# Patient Record
Sex: FEMALE | Race: WHITE | Employment: UNEMPLOYED | URBAN - METROPOLITAN AREA
[De-identification: names, ages, dates, MRNs, and addresses within clinical notes are randomized per-mention and may not be internally consistent; named-entity substitution may affect disease eponyms.]

---

## 2022-01-04 ENCOUNTER — OFFICE VISIT (OUTPATIENT)
Dept: BARIATRICS/WEIGHT MGMT | Age: 57
End: 2022-01-04
Payer: COMMERCIAL

## 2022-01-04 VITALS
BODY MASS INDEX: 34.02 KG/M2 | SYSTOLIC BLOOD PRESSURE: 130 MMHG | HEIGHT: 64 IN | DIASTOLIC BLOOD PRESSURE: 90 MMHG | OXYGEN SATURATION: 98 % | HEART RATE: 94 BPM | WEIGHT: 199.3 LBS

## 2022-01-04 DIAGNOSIS — K64.5 EXTERNAL HEMORRHOID, THROMBOSED: Primary | ICD-10-CM

## 2022-01-04 PROCEDURE — G8427 DOCREV CUR MEDS BY ELIG CLIN: HCPCS | Performed by: SURGERY

## 2022-01-04 PROCEDURE — G8484 FLU IMMUNIZE NO ADMIN: HCPCS | Performed by: SURGERY

## 2022-01-04 PROCEDURE — 99203 OFFICE O/P NEW LOW 30 MIN: CPT | Performed by: SURGERY

## 2022-01-04 PROCEDURE — 1036F TOBACCO NON-USER: CPT | Performed by: SURGERY

## 2022-01-04 PROCEDURE — G8417 CALC BMI ABV UP PARAM F/U: HCPCS | Performed by: SURGERY

## 2022-01-04 PROCEDURE — 3017F COLORECTAL CA SCREEN DOC REV: CPT | Performed by: SURGERY

## 2022-01-04 RX ORDER — LEVOTHYROXINE SODIUM 0.1 MG/1
100 TABLET ORAL DAILY
COMMUNITY

## 2022-01-04 RX ORDER — SODIUM CHLORIDE 0.9 % (FLUSH) 0.9 %
5-40 SYRINGE (ML) INJECTION PRN
Status: CANCELLED | OUTPATIENT
Start: 2022-01-04

## 2022-01-04 RX ORDER — TAMOXIFEN CITRATE 10 MG/1
20 TABLET ORAL DAILY
COMMUNITY

## 2022-01-04 RX ORDER — SODIUM CHLORIDE 9 MG/ML
25 INJECTION, SOLUTION INTRAVENOUS PRN
Status: CANCELLED | OUTPATIENT
Start: 2022-01-04

## 2022-01-04 RX ORDER — SODIUM CHLORIDE 0.9 % (FLUSH) 0.9 %
5-40 SYRINGE (ML) INJECTION EVERY 12 HOURS SCHEDULED
Status: CANCELLED | OUTPATIENT
Start: 2022-01-04

## 2022-01-04 RX ORDER — SODIUM CHLORIDE, SODIUM LACTATE, POTASSIUM CHLORIDE, CALCIUM CHLORIDE 600; 310; 30; 20 MG/100ML; MG/100ML; MG/100ML; MG/100ML
INJECTION, SOLUTION INTRAVENOUS CONTINUOUS
Status: CANCELLED | OUTPATIENT
Start: 2022-01-04

## 2022-01-04 ASSESSMENT — PATIENT HEALTH QUESTIONNAIRE - PHQ9
SUM OF ALL RESPONSES TO PHQ QUESTIONS 1-9: 0
SUM OF ALL RESPONSES TO PHQ QUESTIONS 1-9: 0
SUM OF ALL RESPONSES TO PHQ9 QUESTIONS 1 & 2: 0
SUM OF ALL RESPONSES TO PHQ QUESTIONS 1-9: 0
SUM OF ALL RESPONSES TO PHQ QUESTIONS 1-9: 0
1. LITTLE INTEREST OR PLEASURE IN DOING THINGS: 0
2. FEELING DOWN, DEPRESSED OR HOPELESS: 0

## 2022-01-04 ASSESSMENT — ENCOUNTER SYMPTOMS: ANAL BLEEDING: 1

## 2022-01-04 NOTE — PROGRESS NOTES
file   Physical Activity:     Days of Exercise per Week: Not on file    Minutes of Exercise per Session: Not on file   Stress:     Feeling of Stress : Not on file   Social Connections:     Frequency of Communication with Friends and Family: Not on file    Frequency of Social Gatherings with Friends and Family: Not on file    Attends Christianity Services: Not on file    Active Member of [address] [place] [address] or Organizations: Not on file    Attends Club or Organization Meetings: Not on file    Marital Status: Not on file   Intimate Partner Violence:     Fear of Current or Ex-Partner: Not on file    Emotionally Abused: Not on file    Physically Abused: Not on file    Sexually Abused: Not on file   Housing Stability:     Unable to Pay for Housing in the Last Year: Not on file    Number of [place] in the Last Year: Not on file    Unstable Housing in the Last Year: Not on file       Current Outpatient Medications   Medication Sig Dispense Refill    tamoxifen (NOLVADEX) 10 MG tablet Take 20 mg by mouth daily TAKE ONE TABLET BY MOUTH DAILY.  levothyroxine (SYNTHROID) 100 MCG tablet Take 100 mcg by mouth Daily TAKE ONE TABLET BY MOUTH DAILY. No current facility-administered medications for this visit. No Known Allergies    Review of Systems:         Review of Systems   Gastrointestinal: Positive for anal bleeding. All other systems reviewed and are negative. OBJECTIVE:  Physical Exam:    BP (!) 130/90 (Site: Left Upper Arm, Position: Sitting, Cuff Size: Large Adult)   Pulse 94   Ht 5' 4\" (1.626 m)   Wt 199 lb 4.8 oz (90.4 kg)   SpO2 98%   BMI 34.21 kg/m²      Physical Exam  Vitals reviewed. Constitutional:       General: She is not in acute distress. Appearance: She is not ill-appearing, toxic-appearing or diaphoretic. HENT:      Head: Normocephalic and atraumatic.       Right Ear: External ear normal.      Left Ear: External ear normal.      Nose: Nose normal.   Eyes:      General: Right eye: No discharge. Left eye: No discharge. Extraocular Movements: Extraocular movements intact. Cardiovascular:      Rate and Rhythm: Normal rate. Pulmonary:      Effort: No respiratory distress. Abdominal:      Palpations: Abdomen is soft. Genitourinary:     Comments: External thrombosed hemorrhoid at ~ 3-4 o clock position. No active bleeding. Musculoskeletal:         General: Normal range of motion. Cervical back: Normal range of motion. Skin:     General: Skin is warm. Neurological:      General: No focal deficit present. Mental Status: She is alert. Psychiatric:         Mood and Affect: Mood normal.           ASSESSMENT:  1. External hemorrhoid, thrombosed          PLAN:  Treatment:      -No need for opening and removal of clot as pain has now subsided and initially started 6 days ago. Did d/w pt and  natural course of thrombosed hemorrhoid.     -Pt's main complaint is bleeding. No active bleeding. Did d/w her and her  that would not perform external hemorrhoidectomy in the office bc concern about anesthetic / pain control.     -Mutually agreed after discussing pros and cons that we will plan on doing hemorrhoidectomy after pt is back from her upcoming trips. Did d/w her if she starts to bleed on trip that she should go to ED.     -Continue proper hydration, increase daily fiber (min 25 g / day), and stool softener until able to perform hemorrhoidectomy.     -Orders placed. Consent obtained. Reviewed in detail with the patient and/or family the expected pre-operative, operative, and post-operative courses including risks, benefits, and alternatives to the procedure. The patient's questions were answered in detail and agreed to proceed with the procedure.     -The patient was counseled at length about the risks of glenda Covid-19 during their perioperative period and any recovery window from their procedure.   The patient was made aware that glenda Covid-19  may worsen their prognosis for recovering from their procedure  and lend to a higher morbidity and/or mortality risk. All material risks, benefits, and reasonable alternatives including postponing the procedure were discussed. The patient does wish to proceed with the procedure at this time. .  Patient counseled on risks, benefits, and alternatives of treatment plan at length today. Patient states an understanding and willingness to proceed with plan. No orders of the defined types were placed in this encounter. No orders of the defined types were placed in this encounter. Follow Up:  No follow-ups on file.       Eliana Singh MD

## 2022-01-10 ENCOUNTER — TELEPHONE (OUTPATIENT)
Dept: BARIATRICS/WEIGHT MGMT | Age: 57
End: 2022-01-10

## 2022-01-10 NOTE — TELEPHONE ENCOUNTER
LEFT MESSAGE FOR Amelia Corey REGARDING SURGERY (EXTERNAL HEMORRHOIDECTOMY) SCHEDULED @ [facility].  NOTIFIED OF DATES, TIMES AND LOCATION    PHONE ASSESSMENT   COVID - 2/18/22 @ 262  SURGERY - 2/23/22 @ 890  P/O - 3/8/22 @ 900    NPO AFTER MIDNIGHT  HOLD BLOOD THINNERS

## 2022-01-25 ENCOUNTER — TELEPHONE (OUTPATIENT)
Dept: BARIATRICS/WEIGHT MGMT | Age: 57
End: 2022-01-25

## 2022-01-25 NOTE — TELEPHONE ENCOUNTER
Tue 2022 03:31:20 PM EST  [facility]  Sofie Madrid [address], [address]  JM:[id number]  :Sex:MAGGIE Baker  KA:[phone]  :Nov Medhat Jeb  Referring Provider:  Tacy Nageotte  [address] [facility], [place], [address]  HOP:[phone]  [address]:  [address] [facility]  [address] [facility], [facility], [address] [facility]  PZ:[phone]  QVS:[phone]  TRS:[phone]  Servicing Provider:  Tacy Nageotte  [address] [employer] [address], [facility], [address] [facility]  BP:[phone]  RMO:[id number]  VTM:[phone]  Category:  Health Services Review    Service:  Surgical    Facility:  [facility]    Certification:  Initial    Requested Dates:  2022 - May 24, 2022    Diagnosis codes:  1. K64.4    Residual hemorrhoidal skin tags    Requested Services:  1.  68119:    REMOVAL OF SINGLE EXTERNAL AND INTERNAL HEMORRHOID GROUP  Status: [facility]; [id number] [facility], [address]    1    2.  [id number]:    STAPLING OF INTERNAL HEMORRHOID  Status: [facility]; [id number] [facility], [address]    1    Status:  Joni Harmon 5334 #:  [id number]    Message:  Payment is based on member eligibility, medical necessity review, where applicable and [facility] provider contractual agreement. Authorization does not guarantee payment.

## 2022-02-11 DIAGNOSIS — Z01.818 PRE-OP TESTING: Primary | ICD-10-CM

## 2022-02-18 ENCOUNTER — HOSPITAL ENCOUNTER (OUTPATIENT)
Age: 57
Setting detail: SPECIMEN
Discharge: HOME | End: 2022-02-18
Payer: COMMERCIAL

## 2022-02-18 ENCOUNTER — NURSE ONLY (OUTPATIENT)
Dept: SURGERY | Age: 57
End: 2022-02-18
Payer: COMMERCIAL

## 2022-02-18 DIAGNOSIS — Z01.818 PRE-OP TESTING: Primary | ICD-10-CM

## 2022-02-18 LAB
SARS-COV-2: NOT DETECTED
SOURCE: NORMAL

## 2022-02-18 PROCEDURE — U0003 INFECTIOUS AGENT DETECTION BY NUCLEIC ACID (DNA OR RNA); SEVERE ACUTE RESPIRATORY SYNDROME CORONAVIRUS 2 (SARS-COV-2) (CORONAVIRUS DISEASE [COVID-19]), AMPLIFIED PROBE TECHNIQUE, MAKING USE OF HIGH THROUGHPUT TECHNOLOGIES AS DESCRIBED BY CMS-2020-01-R: HCPCS

## 2022-02-18 PROCEDURE — 99211 OFF/OP EST MAY X REQ PHY/QHP: CPT | Performed by: SURGERY

## 2022-02-18 PROCEDURE — U0005 INFEC AGEN DETEC AMPLI PROBE: HCPCS

## 2022-02-18 NOTE — PATIENT INSTRUCTIONS
Pre-Procedure COVID-19 Self Testing  Quarantine Instructions  Day of Surgery Instructions         What to do before my surgery:    All patients scheduled for elective surgery must test for COVID19 72-96 hours prior to the surgery date.  Pre-Procedure COVID-19 Self-Test will be scheduled for you by your provider.  You can receive your Pre-Procedure COVID-19 Self-Test at:  [facility]. [address] [facility], [address] [facility],Third Floor   If you do not have the COVID-19 test we will cancel or reschedule your procedure   Once you test you must quarantine at home until after your procedure with only your immediate family members or whoever lives with you.  If you must work during your quarantine period, we ask that you continue to practice social distancing, wear a mask that covers your mouth and nose and perform all hand hygiene as recommended by the CDC.  If you must go to the grocery, etc. and cannot get someone to do this for you please wear a mask that covers your mouth and nose and perform all hand hygiene as recommended by the CDC.  Your surgeon's office will notify you with any concerns about your test result. What can I expect on the day of surgery?  Arrive at the time the office or hospital staff tell you on the day of your procedure.  Wear a mask when entering the hospital.     A member of the hospital staff will take your temperature and ask you a few questions as you enter the building.  In abundance of caution for the safety of all our patients and staff, please follow all hospital visitor guidelines in place at the time of your procedure. The staff caring for you will stay in close communication with your loved one and keep them updated on progress.  Please provide a phone number for us to use when communicating with your family or ride home.    When you are ready to discharge, we will notify your

## 2022-02-18 NOTE — PROGRESS NOTES
2/18/22 - . LM concerning  surgery on 2/23/22 - have your covid-19 test performed within 2-7 days of your procedure, then quarantine yourself until after your procedure. Please call the PAT Nurse.

## 2022-02-21 NOTE — PROGRESS NOTES
2/21/22  1113  I left a voicemail with my call back number and a request to return my call to complete the PAT assessment over the phone. I did leave pre-op instructions. 2/21/22 1206 Patient called me back and completed phone PAT assessment. Surgery is at [facility] on 2/23/22. You will be called on 2/22/22  with times. 1. Do not eat or drink anything after midnight - unless instructed by your doctor prior to surgery. This includes no water, chewing gum or mints. 2. Follow your directions as prescribed by the doctor for your procedure and medications. 3. Check with your Doctor regarding stopping vitamins, supplements, blood thinners (Plavix, Coumadin, Lovenox, Effient, Pradaxa, Xarelto, Fragmin or other blood thinners) and follow their instructions. Stop all supplements and herbals 7 days prior to surgery. Morning of surgery take synthroid with a sip of water. 4. Do not smoke, and do not drink any alcoholic beverages 24 hours prior to surgery. This includes NA Beer. 5. You may brush your teeth and gargle the morning of surgery. DO NOT SWALLOW WATER   6. You MUST make arrangements for a responsible adult to take you home after your surgery and be able to check on you every couple hours for the day. You will not be allowed to leave alone or drive yourself home. It is strongly suggested someone stay with you the first 24  hrs. Your surgery will be cancelled if you do not have a ride home. 7. Please wear simple, loose fitting clothing to the hospital.  Zayra Boucher not bring valuables (money, credit cards, checkbooks, etc.) Do not wear any makeup (including no eye makeup) or nail polish on your fingers or toes. 8. DO NOT wear any jewelry or piercings on day of surgery. All body piercing jewelry must be removed. 9. If you have dentures, they will be removed before going to the OR; we will provide you a container.   If you wear contact lenses or glasses,  they will be removed; please bring a case for them. 10. If you  have a Living Will and Durable Power of  for Healthcare, please bring in a copy. 11. Please bring picture ID,  insurance card, paperwork from the doctors office    (H & P, Consent, & card for implantable devices). 12. Take a shower the night before or morning of your procedure, do not apply any lotion, oil or powder. It is recommended to use Hibiclens or CHG wash during pre-op shower/bath. 13. Wear a mask covering your nose & mouth when entering the hospital. Have your covid-19 test performed within 2-7 days of your surgery. Quarantine yourself after the test until after your surgery. COVID TEST done 2/18/22 = negative.

## 2022-02-22 ENCOUNTER — ANESTHESIA EVENT (OUTPATIENT)
Dept: OPERATING ROOM | Age: 57
End: 2022-02-22
Payer: COMMERCIAL

## 2022-02-22 NOTE — ANESTHESIA PRE PROCEDURE
Department of Anesthesiology  Preprocedure Note       Name:  Gosia Arechiga   Age:  49 y.o.  :  1965                                          MRN:  [id number]         Date:  2022      Surgeon: Codi Del Valle):  Ashley Bell MD    Procedure: Procedure(s):  EXTERNAL HEMORRHOIDECTOMY    Medications prior to admission:   Prior to Admission medications    Medication Sig Start Date End Date Taking? Authorizing Provider   tamoxifen (NOLVADEX) 10 MG tablet Take 20 mg by mouth daily TAKE ONE TABLET BY MOUTH DAILY. Yes Historical Provider, MD   levothyroxine (SYNTHROID) 100 MCG tablet Take 100 mcg by mouth Daily TAKE ONE TABLET BY MOUTH DAILY. Yes Historical Provider, MD       Current medications:    No current facility-administered medications for this encounter. Current Outpatient Medications   Medication Sig Dispense Refill    tamoxifen (NOLVADEX) 10 MG tablet Take 20 mg by mouth daily TAKE ONE TABLET BY MOUTH DAILY.  levothyroxine (SYNTHROID) 100 MCG tablet Take 100 mcg by mouth Daily TAKE ONE TABLET BY MOUTH DAILY. Allergies:  No Known Allergies    Problem List:  There is no problem list on file for this patient.       Past Medical History:        Diagnosis Date    Anxiety     [facility]) 2015    Right breat  - bilat mastectomy    Thyroid disease     Acquired hypothyroidism       Past Surgical History:        Procedure Laterality Date    BREAST RECONSTRUCTION Bilateral 2015    [address]    BREAST RECONSTRUCTION Bilateral 2015    BREAST SURGERY Bilateral 2015    Mastectomy    CHOLECYSTECTOMY  2003    TUBAL LIGATION      and ablation of uterine fibroids       Social History:    Social History     Tobacco Use    Smoking status: Former Smoker     Packs/day: 1.00     Types: Cigarettes    Smokeless tobacco: Never Used    Tobacco comment: Stopped smoking in her 19's   Substance Use Topics    Alcohol use: Yes     Comment: social mastectomy). Abdominal:             Vascular: negative vascular ROS. Other Findings:           Anesthesia Plan      general     ASA 2       Induction: intravenous. MIPS: Postoperative opioids intended and Prophylactic antiemetics administered. Anesthetic plan and risks discussed with patient. Plan discussed with CRNA. Pre Anesthesia Evaluation complete. Anesthesia plan, risks, benefits, alternatives, and personal involved discussed with patient. Patients and/or legal guardian verbalized an understanding  and agreed to proceed.   Tomy Murguia, DO  2/23/2022

## 2022-02-23 ENCOUNTER — ANESTHESIA (OUTPATIENT)
Dept: OPERATING ROOM | Age: 57
End: 2022-02-23
Payer: COMMERCIAL

## 2022-02-23 ENCOUNTER — HOSPITAL ENCOUNTER (OUTPATIENT)
Age: 57
Setting detail: OUTPATIENT SURGERY
Discharge: HOME | End: 2022-02-23
Attending: SURGERY | Admitting: SURGERY
Payer: COMMERCIAL

## 2022-02-23 VITALS
TEMPERATURE: 98.6 F | RESPIRATION RATE: 9 BRPM | DIASTOLIC BLOOD PRESSURE: 140 MMHG | SYSTOLIC BLOOD PRESSURE: 174 MMHG | OXYGEN SATURATION: 100 %

## 2022-02-23 VITALS
TEMPERATURE: 98.1 F | WEIGHT: 194 LBS | RESPIRATION RATE: 16 BRPM | SYSTOLIC BLOOD PRESSURE: 117 MMHG | HEIGHT: 64 IN | BODY MASS INDEX: 33.12 KG/M2 | HEART RATE: 57 BPM | DIASTOLIC BLOOD PRESSURE: 72 MMHG | OXYGEN SATURATION: 100 %

## 2022-02-23 DIAGNOSIS — Z98.890 S/P HEMORRHOIDECTOMY: Primary | ICD-10-CM

## 2022-02-23 DIAGNOSIS — Z87.19 S/P HEMORRHOIDECTOMY: Primary | ICD-10-CM

## 2022-02-23 PROCEDURE — 3600000013 HC SURGERY LEVEL 3 ADDTL 15MIN: Performed by: SURGERY

## 2022-02-23 PROCEDURE — 3700000001 HC ADD 15 MINUTES (ANESTHESIA): Performed by: SURGERY

## 2022-02-23 PROCEDURE — C9290 INJ, BUPIVACAINE LIPOSOME: HCPCS | Performed by: SURGERY

## 2022-02-23 PROCEDURE — 88304 TISSUE EXAM BY PATHOLOGIST: CPT | Performed by: PATHOLOGY

## 2022-02-23 PROCEDURE — 3700000000 HC ANESTHESIA ATTENDED CARE: Performed by: SURGERY

## 2022-02-23 PROCEDURE — 2720000010 HC SURG SUPPLY STERILE: Performed by: SURGERY

## 2022-02-23 PROCEDURE — 46250 REMOVE EXT HEM GROUPS 2+: CPT | Performed by: SURGERY

## 2022-02-23 PROCEDURE — 7100000001 HC PACU RECOVERY - ADDTL 15 MIN: Performed by: SURGERY

## 2022-02-23 PROCEDURE — 6360000002 HC RX W HCPCS: Performed by: SURGERY

## 2022-02-23 PROCEDURE — 3600000003 HC SURGERY LEVEL 3 BASE: Performed by: SURGERY

## 2022-02-23 PROCEDURE — 7100000010 HC PHASE II RECOVERY - FIRST 15 MIN: Performed by: SURGERY

## 2022-02-23 PROCEDURE — 2500000003 HC RX 250 WO HCPCS: Performed by: NURSE ANESTHETIST, CERTIFIED REGISTERED

## 2022-02-23 PROCEDURE — 7100000000 HC PACU RECOVERY - FIRST 15 MIN: Performed by: SURGERY

## 2022-02-23 PROCEDURE — 2580000003 HC RX 258: Performed by: SURGERY

## 2022-02-23 PROCEDURE — 2709999900 HC NON-CHARGEABLE SUPPLY: Performed by: SURGERY

## 2022-02-23 PROCEDURE — 6360000002 HC RX W HCPCS: Performed by: NURSE ANESTHETIST, CERTIFIED REGISTERED

## 2022-02-23 PROCEDURE — 7100000011 HC PHASE II RECOVERY - ADDTL 15 MIN: Performed by: SURGERY

## 2022-02-23 RX ORDER — SODIUM CHLORIDE, SODIUM LACTATE, POTASSIUM CHLORIDE, CALCIUM CHLORIDE 600; 310; 30; 20 MG/100ML; MG/100ML; MG/100ML; MG/100ML
INJECTION, SOLUTION INTRAVENOUS CONTINUOUS
Status: DISCONTINUED | OUTPATIENT
Start: 2022-02-23 | End: 2022-02-23 | Stop reason: HOSPADM

## 2022-02-23 RX ORDER — METOCLOPRAMIDE HYDROCHLORIDE 5 MG/ML
10 INJECTION INTRAMUSCULAR; INTRAVENOUS
Status: DISCONTINUED | OUTPATIENT
Start: 2022-02-23 | End: 2022-02-23 | Stop reason: HOSPADM

## 2022-02-23 RX ORDER — LIDOCAINE HYDROCHLORIDE 20 MG/ML
INJECTION, SOLUTION INTRAVENOUS PRN
Status: DISCONTINUED | OUTPATIENT
Start: 2022-02-23 | End: 2022-02-23 | Stop reason: SDUPTHER

## 2022-02-23 RX ORDER — NEOSTIGMINE METHYLSULFATE 5 MG/5 ML
SYRINGE (ML) INTRAVENOUS PRN
Status: DISCONTINUED | OUTPATIENT
Start: 2022-02-23 | End: 2022-02-23 | Stop reason: SDUPTHER

## 2022-02-23 RX ORDER — PROPOFOL 10 MG/ML
INJECTION, EMULSION INTRAVENOUS PRN
Status: DISCONTINUED | OUTPATIENT
Start: 2022-02-23 | End: 2022-02-23 | Stop reason: SDUPTHER

## 2022-02-23 RX ORDER — CEFAZOLIN SODIUM 2 G/100ML
2000 INJECTION, SOLUTION INTRAVENOUS
Status: COMPLETED | OUTPATIENT
Start: 2022-02-23 | End: 2022-02-23

## 2022-02-23 RX ORDER — HYDROCODONE BITARTRATE AND ACETAMINOPHEN 5; 325 MG/1; MG/1
1 TABLET ORAL EVERY 6 HOURS PRN
Qty: 12 TABLET | Refills: 0 | Status: SHIPPED | OUTPATIENT
Start: 2022-02-23 | End: 2022-02-26

## 2022-02-23 RX ORDER — ROCURONIUM BROMIDE 10 MG/ML
INJECTION, SOLUTION INTRAVENOUS PRN
Status: DISCONTINUED | OUTPATIENT
Start: 2022-02-23 | End: 2022-02-23 | Stop reason: SDUPTHER

## 2022-02-23 RX ORDER — SODIUM CHLORIDE 9 MG/ML
25 INJECTION, SOLUTION INTRAVENOUS PRN
Status: DISCONTINUED | OUTPATIENT
Start: 2022-02-23 | End: 2022-02-23 | Stop reason: HOSPADM

## 2022-02-23 RX ORDER — GLYCOPYRROLATE 1 MG/5 ML
SYRINGE (ML) INTRAVENOUS PRN
Status: DISCONTINUED | OUTPATIENT
Start: 2022-02-23 | End: 2022-02-23 | Stop reason: SDUPTHER

## 2022-02-23 RX ORDER — SODIUM CHLORIDE 0.9 % (FLUSH) 0.9 %
5-40 SYRINGE (ML) INJECTION PRN
Status: DISCONTINUED | OUTPATIENT
Start: 2022-02-23 | End: 2022-02-23 | Stop reason: HOSPADM

## 2022-02-23 RX ORDER — HYDRALAZINE HYDROCHLORIDE 20 MG/ML
10 INJECTION INTRAMUSCULAR; INTRAVENOUS
Status: DISCONTINUED | OUTPATIENT
Start: 2022-02-23 | End: 2022-02-23 | Stop reason: HOSPADM

## 2022-02-23 RX ORDER — FENTANYL CITRATE 50 UG/ML
50 INJECTION, SOLUTION INTRAMUSCULAR; INTRAVENOUS EVERY 5 MIN PRN
Status: DISCONTINUED | OUTPATIENT
Start: 2022-02-23 | End: 2022-02-23 | Stop reason: HOSPADM

## 2022-02-23 RX ORDER — SODIUM CHLORIDE 0.9 % (FLUSH) 0.9 %
5-40 SYRINGE (ML) INJECTION EVERY 12 HOURS SCHEDULED
Status: DISCONTINUED | OUTPATIENT
Start: 2022-02-23 | End: 2022-02-23 | Stop reason: HOSPADM

## 2022-02-23 RX ORDER — DEXAMETHASONE SODIUM PHOSPHATE 4 MG/ML
INJECTION, SOLUTION INTRA-ARTICULAR; INTRALESIONAL; INTRAMUSCULAR; INTRAVENOUS; SOFT TISSUE PRN
Status: DISCONTINUED | OUTPATIENT
Start: 2022-02-23 | End: 2022-02-23 | Stop reason: SDUPTHER

## 2022-02-23 RX ORDER — HYDROMORPHONE HCL 110MG/55ML
0.5 PATIENT CONTROLLED ANALGESIA SYRINGE INTRAVENOUS EVERY 5 MIN PRN
Status: DISCONTINUED | OUTPATIENT
Start: 2022-02-23 | End: 2022-02-23 | Stop reason: HOSPADM

## 2022-02-23 RX ORDER — DIPHENHYDRAMINE HYDROCHLORIDE 50 MG/ML
12.5 INJECTION INTRAMUSCULAR; INTRAVENOUS
Status: DISCONTINUED | OUTPATIENT
Start: 2022-02-23 | End: 2022-02-23 | Stop reason: HOSPADM

## 2022-02-23 RX ORDER — FENTANYL CITRATE 50 UG/ML
INJECTION, SOLUTION INTRAMUSCULAR; INTRAVENOUS PRN
Status: DISCONTINUED | OUTPATIENT
Start: 2022-02-23 | End: 2022-02-23 | Stop reason: SDUPTHER

## 2022-02-23 RX ORDER — ONDANSETRON 4 MG/1
4 TABLET, FILM COATED ORAL DAILY PRN
Qty: 20 TABLET | Refills: 3 | Status: SHIPPED | OUTPATIENT
Start: 2022-02-23 | End: 2022-03-08

## 2022-02-23 RX ORDER — AMOXICILLIN 250 MG
2 CAPSULE ORAL DAILY
Qty: 28 TABLET | Refills: 3 | Status: SHIPPED | OUTPATIENT
Start: 2022-02-23 | End: 2022-04-27 | Stop reason: SDUPTHER

## 2022-02-23 RX ORDER — ONDANSETRON 2 MG/ML
INJECTION INTRAMUSCULAR; INTRAVENOUS PRN
Status: DISCONTINUED | OUTPATIENT
Start: 2022-02-23 | End: 2022-02-23 | Stop reason: SDUPTHER

## 2022-02-23 RX ORDER — LABETALOL HYDROCHLORIDE 5 MG/ML
10 INJECTION, SOLUTION INTRAVENOUS
Status: DISCONTINUED | OUTPATIENT
Start: 2022-02-23 | End: 2022-02-23 | Stop reason: HOSPADM

## 2022-02-23 RX ORDER — MEPERIDINE HYDROCHLORIDE 25 MG/ML
12.5 INJECTION INTRAMUSCULAR; INTRAVENOUS; SUBCUTANEOUS EVERY 5 MIN PRN
Status: DISCONTINUED | OUTPATIENT
Start: 2022-02-23 | End: 2022-02-23 | Stop reason: HOSPADM

## 2022-02-23 RX ADMIN — SODIUM CHLORIDE, POTASSIUM CHLORIDE, SODIUM LACTATE AND CALCIUM CHLORIDE: 600; 310; 30; 20 INJECTION, SOLUTION INTRAVENOUS at 08:29

## 2022-02-23 RX ADMIN — Medication 2 MG: at 11:42

## 2022-02-23 RX ADMIN — ONDANSETRON 4 MG: 2 INJECTION INTRAMUSCULAR; INTRAVENOUS at 11:19

## 2022-02-23 RX ADMIN — FENTANYL CITRATE 50 MCG: 50 INJECTION, SOLUTION INTRAMUSCULAR; INTRAVENOUS at 11:04

## 2022-02-23 RX ADMIN — FENTANYL CITRATE 50 MCG: 50 INJECTION, SOLUTION INTRAMUSCULAR; INTRAVENOUS at 11:18

## 2022-02-23 RX ADMIN — CEFAZOLIN SODIUM 2000 MG: 2 INJECTION, SOLUTION INTRAVENOUS at 11:12

## 2022-02-23 RX ADMIN — LIDOCAINE HYDROCHLORIDE 100 MG: 20 INJECTION, SOLUTION INTRAVENOUS at 11:06

## 2022-02-23 RX ADMIN — PROPOFOL 160 MG: 10 INJECTION, EMULSION INTRAVENOUS at 11:06

## 2022-02-23 RX ADMIN — Medication 0.4 MG: at 11:42

## 2022-02-23 RX ADMIN — ROCURONIUM BROMIDE 50 MG: 50 INJECTION, SOLUTION INTRAVENOUS at 11:06

## 2022-02-23 RX ADMIN — DEXAMETHASONE SODIUM PHOSPHATE 4 MG: 4 INJECTION, SOLUTION INTRA-ARTICULAR; INTRALESIONAL; INTRAMUSCULAR; INTRAVENOUS; SOFT TISSUE at 11:06

## 2022-02-23 ASSESSMENT — PULMONARY FUNCTION TESTS
PIF_VALUE: 3
PIF_VALUE: 17
PIF_VALUE: 19
PIF_VALUE: 2
PIF_VALUE: 1
PIF_VALUE: 19
PIF_VALUE: 30
PIF_VALUE: 17
PIF_VALUE: 19
PIF_VALUE: 18
PIF_VALUE: 17
PIF_VALUE: 18
PIF_VALUE: 19
PIF_VALUE: 18
PIF_VALUE: 1
PIF_VALUE: 19
PIF_VALUE: 1
PIF_VALUE: 17
PIF_VALUE: 13
PIF_VALUE: 17
PIF_VALUE: 13
PIF_VALUE: 17
PIF_VALUE: 1
PIF_VALUE: 19
PIF_VALUE: 17
PIF_VALUE: 17
PIF_VALUE: 3
PIF_VALUE: 19
PIF_VALUE: 20
PIF_VALUE: 17
PIF_VALUE: 17
PIF_VALUE: 1
PIF_VALUE: 17
PIF_VALUE: 1
PIF_VALUE: 17
PIF_VALUE: 17
PIF_VALUE: 19
PIF_VALUE: 17

## 2022-02-23 ASSESSMENT — PAIN SCALES - GENERAL
PAINLEVEL_OUTOF10: 0
PAINLEVEL_OUTOF10: 0

## 2022-02-23 ASSESSMENT — ENCOUNTER SYMPTOMS
EYES NEGATIVE: 1
ALLERGIC/IMMUNOLOGIC NEGATIVE: 1
CONSTIPATION: 1
RESPIRATORY NEGATIVE: 1

## 2022-02-23 ASSESSMENT — PAIN - FUNCTIONAL ASSESSMENT: PAIN_FUNCTIONAL_ASSESSMENT: 0-10

## 2022-02-23 NOTE — PROGRESS NOTES
Patients surgery time was called yeterday and voicemail left confirming surgery at 1100 and arrival time of 0900. Patients surgery time was changed in the evening after 1600 on 2/22/22. I had a voicemail from patient this morning (2/23/22) from 1635 yesterday stating it looks like surgery time has changed and she just wanted to make sure, to please give her a call. I verified the surgery time, which had changed from what I told her to surgery at [address] and arrival time of 46. I verified with Jarrell Severs in surgery scheduling that time had been changed. I also spoke to [facility] and she confirmed that surgery time had to be changed and that [facility] JOSE LUIS had tried to reach patient and left her a voicemail. I returned patients call at 1487 and it went to voicemail. I did explain that surgery time had changed again and I apologized for the changes and gave her the new times and ask that she give me a call if possible.

## 2022-02-23 NOTE — OP NOTE
brooke. Hemostasis and skin edge approximation was completed with 3-0 chromic sutures. At the end of the case, needle, instrument, and sponger counts were correct times two. At the end of the case, the patient was extubated and transferred to the PACU in stable condition. At the end of the case, Dr. Maikel Izquierdo personally informed the patient's family of the outcome of the procedure. Dr. Maikel Izquierdo was present, scrubbed, and supervised the entire procedure.     Desiree Fabian MD

## 2022-02-23 NOTE — PROGRESS NOTES
1216 pt recd from pacu to [facility] room 17. Report at bedside from jesus lucas. Pt denies pain or nausea.  at bedside, call light in reach.   1222 soda provided

## 2022-02-23 NOTE — ANESTHESIA POSTPROCEDURE EVALUATION
Department of Anesthesiology  Postprocedure Note    Patient: Dea Hdez  MRN: [id number]  YOB: 1965  Date of evaluation: 2/23/2022  Time:  11:57 AM     Procedure Summary     Date: 02/23/22 Room / Location: [address]    Anesthesia Start: 1100 Anesthesia Stop: 2585    Procedure: EXTERNAL HEMORRHOIDECTOMY (N/A Anus) Diagnosis: (EXTERNAL HEMORRHOIDS)    Surgeons: Amrik Narayanan MD Responsible Provider: Monica Yanes DO    Anesthesia Type: general ASA Status: 2          Anesthesia Type: general    Audelia Phase I: Audelia Score: 10    Audelia Phase II:      Last vitals: Reviewed and per EMR flowsheets.        Anesthesia Post Evaluation    Patient location during evaluation: PACU  Patient participation: complete - patient participated  Level of consciousness: awake and alert  Pain score: 0  Airway patency: patent  Nausea & Vomiting: no vomiting and no nausea  Complications: no  Cardiovascular status: blood pressure returned to baseline and hemodynamically stable  Respiratory status: acceptable, spontaneous ventilation, nonlabored ventilation and nasal cannula  Hydration status: stable

## 2022-02-23 NOTE — H&P
History and Physical              Subjective:     Patient is a 49 y.o.  female scheduled for external hemorrhoidectomy. Indications for procedure are symptomatic and previously bleeding external hemorrhoid. Pt was seen in clinic in January 2022 and reports the following changes in health since that time:    None    Discussed Blood/Blood Products with patient and/or family: yes    There are no problems to display for this patient. Past Medical History:   Diagnosis Date    Anxiety     [facility] 04/2015    Right breat  - bilat mastectomy    Thyroid disease     Acquired hypothyroidism        Past Surgical History:   Procedure Laterality Date    [facility]09/2015    [facility]    BREAST RECONSTRUCTION Bilateral 09/2015    BREAST SURGERY Bilateral 04/2015    Mastectomy    CHOLECYSTECTOMY  2003    TUBAL LIGATION  2009    and ablation of uterine fibroids        Medications Prior to Admission: tamoxifen (NOLVADEX) 10 MG tablet, Take 20 mg by mouth daily TAKE ONE TABLET BY MOUTH DAILY. levothyroxine (SYNTHROID) 100 MCG tablet, Take 100 mcg by mouth Daily TAKE ONE TABLET BY MOUTH DAILY. No Known Allergies     Social History     Tobacco Use    Smoking status: Former Smoker     Packs/day: 1.00     Types: Cigarettes    Smokeless tobacco: Never Used    Tobacco comment: Stopped smoking in her 21's   Substance Use Topics    Alcohol use: Yes     Comment: social        History reviewed. No pertinent family history. Review of Systems  Review of Systems   Constitutional: Negative. HENT: Negative. Eyes: Negative. Respiratory: Negative. Cardiovascular: Negative. Gastrointestinal: Positive for constipation. Endocrine: Negative. Genitourinary: Negative. Musculoskeletal: Negative. Skin: Negative. Allergic/Immunologic: Negative. Neurological: Negative. Hematological: Negative. Psychiatric/Behavioral: Negative.     All other systems reviewed and are negative. Objective:     Patient Vitals for the past 8 hrs:   BP Temp Temp src Pulse Resp SpO2 Height Weight   02/23/22 0831 138/79 98.6 °F (37 °C) Temporal 64 22 100 % 5' 4\" (1.626 m) 194 lb (88 kg)       Physical Exam  Vitals reviewed. Constitutional:       General: She is not in acute distress. Appearance: She is not ill-appearing, toxic-appearing or diaphoretic. HENT:      Head: Normocephalic and atraumatic. Right Ear: External ear normal.      Left Ear: External ear normal.      Nose: Nose normal.   Eyes:      General:         Right eye: No discharge. Left eye: No discharge. Extraocular Movements: Extraocular movements intact. Pulmonary:      Effort: Pulmonary effort is normal.   Abdominal:      Tenderness: There is no guarding. Musculoskeletal:         General: No swelling. Cervical back: Normal range of motion. Skin:     General: Skin is warm. Neurological:      General: No focal deficit present. Mental Status: She is alert. Psychiatric:         Mood and Affect: Mood normal.         Data ReviewCBC: No results found for: WBC, RBC, HEMOGLOBIN  BMP: No results found for: GLUCOSE, POTASSIUM, CO2, BUN, CREATININE, CALCIUM    Assessment:     50 y/o F with symptomatic external hemorrhoids    Plan:       -Consent obtained. -Reviewed expected pre-operative, operative, and post-operative courses with patient and/or family. -Answered questions to patient's satisfaction.   -Reviewed risks, benefits, alternative to procedure.   -Proceed as scheduled. -The patient was counseled at length about the risks of glenda Covid-19 during their perioperative period and any recovery window from their procedure. The patient was made aware that glenda Covid-19  may worsen their prognosis for recovering from their procedure  and lend to a higher morbidity and/or mortality risk.   All material risks, benefits, and reasonable alternatives including postponing the procedure were discussed. The patient does wish to proceed with the procedure at this time.         Electronically signed by Layla Newby II, MD on 2/23/2022 at 10:36 AM

## 2022-02-23 NOTE — PROGRESS NOTES
1153: Patient arrived to PACU from OR. Monitors applied, alarms on. Mesh panties on. Report obtained from [facility] and [address].  1200: Patient turned and repositioned in bed. Tolerating ice chips at this time. 1214: Phase 1 care complete. Patient transferred to [facility] 17. Report given to Maira Beltran RN at bedside.

## 2022-02-23 NOTE — PROGRESS NOTES
1235  Pt denies any pain or nausea. VS remain stable. States is ready to go home now. 1240 IV dc'd and pt up in room to get dressed. 1250  Pt and  instructed for discharge care and follow-up and use of all Rx with understanding voiced. 1300  Pt to car at exit per wheelchair by volunteer.

## 2022-03-07 NOTE — PROGRESS NOTES
[id number] [facility]    PATIENT: Bienvenido Aragon 1965, 49 y.o., female    MRN: [id number]    Physician: Edel Onofre MD    Date: 3/8/22    CHIEF COMPLAINT:     Chief Complaint   Patient presents with    Post-Op Check     1st P/O external Hemorrhoidectomy, 2/23/22     History Obtained From:  patient, electronic medical record    HISTORY OF PRESENT ILLNESS:      Miac Stroud is a 49 y.o. female presenting postoperatively after external hemorrhoidectomy with Dr. Maida Meyer. Since the procedure, she has been doing ok overall, but has a new tender area on the other side. She has been using Tucks pads and hemorrhoid cream.     Eating a regular diet without difficulty. Bowel movement are Normal - taking less of the stool softeners now. Pain is controlled without any medications. Wounds/Incisions: are healing well. No drainage or erythema. Past Medical History:    Past Medical History:   Diagnosis Date    Anxiety     Cancer ([facility]) 04/2015    Right breat  - bilat mastectomy    Thyroid disease     Acquired hypothyroidism       Past Surgical History:    Past Surgical History:   Procedure Laterality Date    BREAST RECON[address]CTION Bilateral 09/09/2015    [address]    BREAST RECONSTRUCTION Bilateral 09/2015    BREAST SURGERY Bilateral 04/2015    Mastectomy    CHOLECYSTECTOMY  2003    HEMORRHOID SURGERY N/A 2/23/2022    EXTERNAL HEMORR[facility]ECTOMY performed [facility] Su Jason [facility]  2009    and ablation of uterine fibroids       Current Medications:   Current Outpatient Medications   Medication Sig Dispense Refill    lidocaine (LIDAMANTLE) 3 % CREA Apply small amount topically to anal area three times daily as needed for pain. 28 g 0    senna-docusate (PERICOLACE) 8.6-50 MG per tablet Take 2 tablets by mouth daily for 14 days 28 tablet 3    tamoxifen (NOLVADEX) 10 MG tablet Take 20 mg by mouth daily TAKE ONE TABLET BY MOUTH DAILY.  levothyroxine (SYNTHROID) 100 MCG tablet Take 100 mcg by mouth Daily TAKE ONE TABLET BY MOUTH DAILY. No current facility-administered medications for this visit. Allergies:  Patient has no known allergies. Social History:   Social History     Socioeconomic History    Marital status:      Spouse name: None    Number of children: None    Years of education: None    Highest education level: None   Occupational History    None   Tobacco Use    Smoking status: Former Smoker     Packs/day: 1.00     Types: Cigarettes    Smokeless tobacco: Never Used    Tobacco comment: Stopped smoking in her 19's   Vaping Use    Vaping Use: Never used   Substance and Sexual Activity    Alcohol use: Yes     Comment: social    Drug use: Never    Sexual activity: None   Other Topics Concern    None   Social History Narrative    None     Social Determinants of Health     Financial Resource Strain:     Difficulty of Paying Living Expenses: Not on file   Food Insecurity:     Worried About Running Out of Food in the Last Year: Not on file    Kailey of Food in the Last Year: Not on file   Transportation Needs:     Lack of Transportation (Medical): Not on file    Lack of Transportation (Non-Medical):  Not on file   Physical Activity:     Days of Exercise per Week: Not on file    Minutes of Exercise per Session: Not on file   Stress:     Feeling of Stress : Not on file   Social Connections:     Frequency of Communication with Friends and Family: Not on file    Frequency of Social Gatherings with Friends and Family: Not on file    Attends Confucianist Services: Not on file    Active Member of Clubs or Organizations: Not on file    Attends Club or Organization Meetings: Not on file    Marital Status: Not on file   Intimate Partner Violence:     Fear of Current or Ex-Partner: Not on file    Emotionally Abused: Not on file    Physically Abused: Not on file    Sexually Abused: Not on file   Housing Stability:     Unable to Pay for Housing in the Last Year: Not on file    Number of Places Lived in the Last Year: Not on file    Unstable Housing in the Last Year: Not on file       Family History:   History reviewed. No pertinent family history. REVIEW OF SYSTEMS:    Review of Systems   Constitutional: Negative for chills and fever. Respiratory: Negative for shortness of breath. Cardiovascular: Negative for chest pain. Gastrointestinal: Positive for rectal pain. Negative for abdominal pain, anal bleeding, constipation, diarrhea, nausea and vomiting. I have reviewed the patient's information pertinent to this visit, including medical history, family history, social history and review of systems. PHYSICAL EXAM:    Vitals:    03/08/22 0901   BP: (!) 140/98   Site: Left Upper Arm   Position: Sitting   Cuff Size: Medium Adult   Pulse: 85   Weight: 194 lb (88 kg)   Height: 5' 4\" (1.626 m)       Physical Exam  Constitutional:       General: She is not in acute distress. Appearance: She is well-developed. She is not diaphoretic. HENT:      Head: Normocephalic and atraumatic. Eyes:      Pupils: Pupils are equal, round, and reactive to light. Cardiovascular:      Rate and Rhythm: Normal rate and regular rhythm. Pulmonary:      Effort: Pulmonary effort is normal. No respiratory distress. Abdominal:      Palpations: Abdomen is soft. Tenderness: There is no abdominal tenderness. Genitourinary:     Rectum: Tenderness and external hemorrhoid present. No mass or anal fissure. Normal anal tone. Neurological:      Mental Status: She is alert and oriented to person, place, and time. Psychiatric:         Behavior: Behavior normal.         DATA:    Pathology Results:   Specimen #[id number]       Final Pathologic Diagnosis:   External hemorrhoids, hemorrhoidectomy:   -     Squamous epithelium with underlying fibrovascular   stroma.      Labs:  No results found for: WBC, HGB, HCT, PLT, NA, K, CL, CO2, BUN, CREATININE, GLUCOSE, CALCIUM, PROT, ALBUMIN, BILITOT, AST, ALT, ALKPHOS, AMYLASE, LIPASE, INR, GLUF, LABA1C, LABMICR    Imaging:   No results found. Pertinent laboratory and imaging studies were personally reviewed if available. IMPRESSION:    Wallace Thompson is a 49 y.o. female following-up postoperatively from external hemorrhoidectomy with Dr. Kelby Olivo. Visit Diagnoses:  1. External hemorrhoids        There are no problems to display for this patient. PLAN:   Continue current care   Increase activity as tolerated   Tiny thrombosed hemorrhoid: Topical lidocaine PRN. Continue bowel regimen   Follow Up: Return in about 3 weeks (around 3/29/2022) for re-check with Dr. Kelby Olivo. No orders of the defined types were placed in this encounter. Orders Placed This Encounter   Medications    lidocaine (LIDAMANTLE) 3 % CREA     Sig: Apply small amount topically to anal area three times daily as needed for pain.      Dispense:  28 g     Refill:  0       Electronically signed by Win Wan MD, 3/8/2022, 9:39 AM.

## 2022-03-08 ENCOUNTER — OFFICE VISIT (OUTPATIENT)
Dept: SURGERY | Age: 57
End: 2022-03-08

## 2022-03-08 VITALS
BODY MASS INDEX: 33.12 KG/M2 | WEIGHT: 194 LBS | HEIGHT: 64 IN | DIASTOLIC BLOOD PRESSURE: 98 MMHG | SYSTOLIC BLOOD PRESSURE: 140 MMHG | HEART RATE: 85 BPM

## 2022-03-08 DIAGNOSIS — K64.4 EXTERNAL HEMORRHOIDS: Primary | ICD-10-CM

## 2022-03-08 PROCEDURE — 99024 POSTOP FOLLOW-UP VISIT: CPT | Performed by: SURGERY

## 2022-03-08 RX ORDER — LIDOCAINE HYDROCHLORIDE 30 MG/G
CREAM TOPICAL
Qty: 28 G | Refills: 0 | Status: SHIPPED | OUTPATIENT
Start: 2022-03-08

## 2022-03-08 ASSESSMENT — ENCOUNTER SYMPTOMS
SHORTNESS OF BREATH: 0
RECTAL PAIN: 1
ABDOMINAL PAIN: 0
DIARRHEA: 0
CONSTIPATION: 0
VOMITING: 0
ANAL BLEEDING: 0
NAUSEA: 0

## 2022-03-29 ENCOUNTER — OFFICE VISIT (OUTPATIENT)
Dept: BARIATRICS/WEIGHT MGMT | Age: 57
End: 2022-03-29

## 2022-03-29 VITALS
HEART RATE: 87 BPM | BODY MASS INDEX: 34.67 KG/M2 | DIASTOLIC BLOOD PRESSURE: 82 MMHG | WEIGHT: 203.1 LBS | OXYGEN SATURATION: 99 % | HEIGHT: 64 IN | SYSTOLIC BLOOD PRESSURE: 130 MMHG

## 2022-03-29 DIAGNOSIS — Z98.890 S/P HEMORRHOIDECTOMY: Primary | ICD-10-CM

## 2022-03-29 DIAGNOSIS — Z87.19 S/P HEMORRHOIDECTOMY: Primary | ICD-10-CM

## 2022-03-29 PROCEDURE — 99024 POSTOP FOLLOW-UP VISIT: CPT | Performed by: SURGERY

## 2022-03-29 ASSESSMENT — PATIENT HEALTH QUESTIONNAIRE - PHQ9
SUM OF ALL RESPONSES TO PHQ QUESTIONS 1-9: 0
2. FEELING DOWN, DEPRESSED OR HOPELESS: 0
SUM OF ALL RESPONSES TO PHQ QUESTIONS 1-9: 0
SUM OF ALL RESPONSES TO PHQ9 QUESTIONS 1 & 2: 0
SUM OF ALL RESPONSES TO PHQ QUESTIONS 1-9: 0
SUM OF ALL RESPONSES TO PHQ QUESTIONS 1-9: 0
1. LITTLE INTEREST OR PLEASURE IN DOING THINGS: 0

## 2022-03-29 NOTE — PROGRESS NOTES
Post-Operative Clinic Note    Chief Complaint   Patient presents with    Post-Op Check     2nd p[facility]         SUBJECTIVE:  Patient is here today for a post-operative visit. Patient is s/p external hemorrhoidectomy on 2/23/22. Doing well except for some mild pain after BM. Taking stool softener. Past Surgical History:   Procedure Laterality Date    [facility]CTION [facility]09/2015    [facility]    BREAST RECONSTRUCTION Bilateral 09/2015    BREAST SURGERY Bilateral 04/2015    Mastectomy    CHOLECYSTECTOMY  2003    HEMORRHOID SURGERY N/A 2/23/2022    EXTERNAL HEMORRHOIDECTOMY performed by Ramya Saab MD at [address] [place] [address]  2009    and ablation of uterine fibroids     Past Medical History:   Diagnosis Date    Anxiety     Cancer [facility]) 04/2015    Right breat  - bilat mastectomy    Thyroid disease     Acquired hypothyroidism     No family history on file. Social History     Socioeconomic History    Marital status:      Spouse name: Not on file    Number of children: Not on file    Years of education: Not on file    Highest education level: Not on file   Occupational History    Not on file   Tobacco Use    Smoking status: Former Smoker     Packs/day: 1.00     Types: Cigarettes    Smokeless tobacco: Never Used    Tobacco comment: Stopped smoking in her 21's   Vaping Use    Vaping Use: Never used   Substance and Sexual Activity    Alcohol use: Yes     Comment: social    Drug use: Never    Sexual activity: Not on file   Other Topics Concern    Not on file   Social History Narrative    Not on file     Social Determinants of Health     Financial Resource Strain:     Difficulty of Paying Living Expenses: Not on file   Food Insecurity:     Worried About Running Out of Food in the Last Year: Not on file    Kailey of Food in the Last Year: Not on file   Transportation Needs:     Lack of Transportation (Medical):  Not on file    Lack of Transportation (Non-Medical): Not on file   Physical Activity:     Days of Exercise per Week: Not on file    Minutes of Exercise per Session: Not on file   Stress:     Feeling of Stress : Not on file   Social Connections:     Frequency of Communication with Friends and Family: Not on file    Frequency of Social Gatherings with Friends and Family: Not on file    Attends Hindu Services: Not on file    Active Member of [address] [place] [address] or Organizations: Not on file    Attends Club or Organization Meetings: Not on file    Marital Status: Not on file   Intimate Partner Violence:     Fear of Current or Ex-Partner: Not on file    Emotionally Abused: Not on file    Physically Abused: Not on file    Sexually Abused: Not on file   Housing Stability:     Unable to Pay for Housing in the Last Year: Not on file    Number of [place] in the Last Year: Not on file    Unstable Housing in the Last Year: Not on file       OBJECTIVE:  Physical Exam   A&Ox3, NAD at rest.  AT. NC. Breathing unlabored. RRR. Anus - healing appropriately. No external hemorrhoids. Area that pt appreciates is not a hemorrhoid and is actually just external anal sphincter. There is a small 2-3 mm tear inner anal canal that I suspect is cause of pt's symptoms. Pathology report reveals:   Final Pathologic Diagnosis:   External hemorrhoids, hemorrhoidectomy:   -     Squamous epithelium with underlying fibrovascular   stroma. ASSESSMENT:  S/p external hemorrhoidectomy    No diagnosis found. PLAN:  1. Refill stool softener when pt needs. 2. Healing appropriately. 3. Suspect once small tear in anal canal is completely healed that pt's symptoms will be completely resolved. F/u in 4-6 weeks. Overall she is improving significantly from before surgery. No orders of the defined types were placed in this encounter. No orders of the defined types were placed in this encounter.        Follow Up: No follow-ups on file.    Zeus Hutchins MD

## 2022-04-27 RX ORDER — AMOXICILLIN 250 MG
2 CAPSULE ORAL DAILY
Qty: 28 TABLET | Refills: 3 | Status: SHIPPED | OUTPATIENT
Start: 2022-04-27 | End: 2022-06-22

## 2022-05-10 ENCOUNTER — OFFICE VISIT (OUTPATIENT)
Dept: BARIATRICS/WEIGHT MGMT | Age: 57
End: 2022-05-10

## 2022-05-10 VITALS
HEIGHT: 64 IN | BODY MASS INDEX: 35.01 KG/M2 | DIASTOLIC BLOOD PRESSURE: 84 MMHG | SYSTOLIC BLOOD PRESSURE: 122 MMHG | WEIGHT: 205.1 LBS | HEART RATE: 85 BPM

## 2022-05-10 DIAGNOSIS — Z87.19 S/P HEMORRHOIDECTOMY: Primary | ICD-10-CM

## 2022-05-10 DIAGNOSIS — Z98.890 S/P HEMORRHOIDECTOMY: Primary | ICD-10-CM

## 2022-05-10 PROCEDURE — 99024 POSTOP FOLLOW-UP VISIT: CPT | Performed by: NURSE PRACTITIONER

## 2022-05-10 NOTE — PROGRESS NOTES
Post-Operative Clinic Note    Chief Complaint   Patient presents with    Post-Op Check     2nd P/O External Hemorrhoidectomy @ [facility] 2/23/22         SUBJECTIVE:  Patient is here today for a post-operative visit. Patient is s/p external hemorrhoidectomy on 2/23/200     Doing well. No complaints  Denies pain  Still taking stool softeners    Past Surgical History:   Procedure Laterality Date    [address]AST [address]ON[address]CTION [address]ilateral 09/09/[address]    [address]    BREAST RECONSTRUCTION Bilateral 09/2015    BREAST SURGERY Bilateral 04/2015    Mastectomy    CHOLECYSTECTOMY  2003    HEMORRHOID SURGERY N/A 2/23/2022    EXTERNAL HEMORRHOIDECTOMY performed by Adrianne Kaur MD at [address] [place] [address]    and ablation of uterine fibroids     Past Medical History:   Diagnosis Date    Anxiety     Cancer [facility]) 04/2015    Right breat  - bilat mastectomy    Thyroid disease     Acquired hypothyroidism     No family history on file. Social History     Socioeconomic History    Marital status:      Spouse name: Not on file    Number of children: Not on file    Years of education: Not on file    Highest education level: Not on file   Occupational History    Not on file   Tobacco Use    Smoking status: Former Smoker     Packs/day: 1.00     Types: Cigarettes    Smokeless tobacco: Never Used    Tobacco comment: Stopped smoking in her 21's   Vaping Use    Vaping Use: Never used   Substance and Sexual Activity    Alcohol use: Yes     Comment: social    Drug use: Never    Sexual activity: Not on file   Other Topics Concern    Not on file   Social History Narrative    Not on file     Social Determinants of Health     Financial Resource Strain:     Difficulty of Paying Living Expenses: Not on file   Food Insecurity:     Worried About Running Out of Food in the Last Year: Not on file    Kailey of Food in the Last Year: Not on file   Transportation Needs:     Lack of Transportation (Medical):  Not on file    Lack of Transportation (Non-Medical): Not on file   Physical Activity:     Days of Exercise per Week: Not on file    Minutes of Exercise per Session: Not on file   Stress:     Feeling of Stress : Not on file   Social Connections:     Frequency of Communication with Friends and Family: Not on file    Frequency of Social Gatherings with Friends and Family: Not on file    Attends Spiritism Services: Not on file    Active Member of [address] [place] [address] or Organizations: Not on file    Attends Club or Organization Meetings: Not on file    Marital Status: Not on file   Intimate Partner Violence:     Fear of Current or Ex-Partner: Not on file    Emotionally Abused: Not on file    Physically Abused: Not on file    Sexually Abused: Not on file   Housing Stability:     Unable to Pay for Housing in the Last Year: Not on file    Number of [place] in the Last Year: Not on file    Unstable Housing in the Last Year: Not on file       OBJECTIVE:  Physical Exam  Vitals reviewed. Constitutional:       Appearance: She is obese. HENT:      Head: Normocephalic and atraumatic. Right Ear: External ear normal.      Left Ear: External ear normal.      Nose: Nose normal.      Mouth/Throat:      Mouth: Mucous membranes are moist.   Eyes:      Extraocular Movements: Extraocular movements intact. Pupils: Pupils are equal, round, and reactive to light. Cardiovascular:      Rate and Rhythm: Normal rate and regular rhythm. Pulses: Normal pulses. Heart sounds: Normal heart sounds. Pulmonary:      Effort: Pulmonary effort is normal.      Breath sounds: Normal breath sounds. Abdominal:      General: Bowel sounds are normal.   Genitourinary:     Comments: S/p hemorrhoidectomy. Denies pain  Musculoskeletal:         General: Normal range of motion. Cervical back: Normal range of motion and neck supple. Skin:     General: Skin is warm and dry. Neurological:      General: No focal deficit present.       Mental Status: She is alert and oriented to person, place, and time. Mental status is at baseline. Psychiatric:         Mood and Affect: Mood normal.         Behavior: Behavior normal.       ASSESSMENT:  Candi Alford is a 48 yo female s/p hemorrhoidectomy. 1. S/P hemorrhoidectomy        PLAN:  1. Doing well. All issues have resolved  2. Diet as tolerated  3. Activity as tolerated  4. Follow up PRN  5. Recommend daily fiber and increased fluids. Education provided to prevent recurrence. Discussed not relying on stool softeners for bowel movements. No orders of the defined types were placed in this encounter. No orders of the defined types were placed in this encounter. Follow Up: Return if symptoms worsen or fail to improve.     Juan Francisco Howard, APRN - CNP

## (undated) DEVICE — SPONGE GZ W4XL8IN COT WVN 12 PLY

## (undated) DEVICE — YANKAUER,FLEXIBLE HANDLE,REGLR CAPACITY: Brand: MEDLINE INDUSTRIES, INC.

## (undated) DEVICE — SYRINGE IRRIG 60ML SFT PLIABLE BLB EZ TO GRP 1 HND USE W/

## (undated) DEVICE — GOWN,SIRUS,POLYRNF,BRTHSLV,XLN/XL,20/CS: Brand: MEDLINE

## (undated) DEVICE — SUTURE PROL SZ 2-0 L30IN NONABSORBABLE BLU L26MM SH 1/2 CIR 8833H

## (undated) DEVICE — TOWEL,OR,DSP,ST,BLUE,STD,6/PK,12PK/CS: Brand: MEDLINE

## (undated) DEVICE — GLOVE ORANGE PI 7   MSG9070

## (undated) DEVICE — DRAPE,LITHOTOMY,STERILE: Brand: MEDLINE

## (undated) DEVICE — GLOVE SURG SZ 7 L12IN FNGR THK79MIL GRN LTX FREE

## (undated) DEVICE — TUBING, SUCTION, 9/32" X 10', STRAIGHT: Brand: MEDLINE

## (undated) DEVICE — ELECTRODE ES AD CRDLSS PT RET REM POLYHESIVE

## (undated) DEVICE — COUNTER NDL 30 COUNT FOAM STRP SGL MAG

## (undated) DEVICE — PACK,BASIC,SIRUS,V: Brand: MEDLINE

## (undated) DEVICE — MATERNITY KNIT PANTS,SEAMLESS: Brand: WINGS

## (undated) DEVICE — PENCIL ES CRD L10FT HND SWCHING ROCK SWCH W/ EDGE COAT BLDE

## (undated) DEVICE — GAUZE,SPONGE,4"X4",16PLY,XRAY,STRL,LF: Brand: MEDLINE

## (undated) DEVICE — GLOVE ORANGE PI 8   MSG9080

## (undated) DEVICE — SUTURE CHROMIC GUT SZ 3-0 L27IN ABSRB BRN L26MM SH 1/2 CIR G122H

## (undated) DEVICE — GLOVE ORANGE PI 7 1/2   MSG9075

## (undated) DEVICE — GLOVE SURG SZ 65 THK91MIL LTX FREE SYN POLYISOPRENE

## (undated) DEVICE — INTENDED FOR TISSUE SEPARATION, AND OTHER PROCEDURES THAT REQUIRE A SHARP SURGICAL BLADE TO PUNCTURE OR CUT.: Brand: BARD-PARKER ® STAINLESS STEEL BLADES